# Patient Record
Sex: MALE | Race: WHITE
[De-identification: names, ages, dates, MRNs, and addresses within clinical notes are randomized per-mention and may not be internally consistent; named-entity substitution may affect disease eponyms.]

---

## 2017-01-10 PROBLEM — Z00.00 ENCOUNTER FOR PREVENTIVE HEALTH EXAMINATION: Status: ACTIVE | Noted: 2017-01-10

## 2017-01-25 ENCOUNTER — APPOINTMENT (OUTPATIENT)
Dept: HEART AND VASCULAR | Facility: CLINIC | Age: 62
End: 2017-01-25

## 2017-01-25 VITALS
DIASTOLIC BLOOD PRESSURE: 88 MMHG | HEIGHT: 69 IN | BODY MASS INDEX: 29.77 KG/M2 | WEIGHT: 201 LBS | HEART RATE: 67 BPM | SYSTOLIC BLOOD PRESSURE: 124 MMHG

## 2017-01-25 DIAGNOSIS — R06.02 SHORTNESS OF BREATH: ICD-10-CM

## 2017-01-25 DIAGNOSIS — R07.89 OTHER CHEST PAIN: ICD-10-CM

## 2017-01-25 DIAGNOSIS — Z87.891 PERSONAL HISTORY OF NICOTINE DEPENDENCE: ICD-10-CM

## 2017-01-25 DIAGNOSIS — Z83.3 FAMILY HISTORY OF DIABETES MELLITUS: ICD-10-CM

## 2017-01-25 DIAGNOSIS — Z82.3 FAMILY HISTORY OF STROKE: ICD-10-CM

## 2017-01-25 DIAGNOSIS — Z78.9 OTHER SPECIFIED HEALTH STATUS: ICD-10-CM

## 2017-01-25 DIAGNOSIS — Z82.49 FAMILY HISTORY OF ISCHEMIC HEART DISEASE AND OTHER DISEASES OF THE CIRCULATORY SYSTEM: ICD-10-CM

## 2017-01-25 DIAGNOSIS — Z86.39 PERSONAL HISTORY OF OTHER ENDOCRINE, NUTRITIONAL AND METABOLIC DISEASE: ICD-10-CM

## 2017-01-25 RX ORDER — ASPIRIN 81 MG/1
81 TABLET, CHEWABLE ORAL DAILY
Qty: 30 | Refills: 5 | Status: ACTIVE | COMMUNITY
Start: 2017-01-25

## 2017-01-26 ENCOUNTER — MEDICATION RENEWAL (OUTPATIENT)
Age: 62
End: 2017-01-26

## 2017-01-26 LAB
ALBUMIN SERPL ELPH-MCNC: 4.8 G/DL
ALP BLD-CCNC: 76 U/L
ALT SERPL-CCNC: 59 U/L
ANION GAP SERPL CALC-SCNC: 19 MMOL/L
AST SERPL-CCNC: 54 U/L
BASOPHILS # BLD AUTO: 0.03 K/UL
BASOPHILS NFR BLD AUTO: 0.4 %
BILIRUB SERPL-MCNC: 0.8 MG/DL
BUN SERPL-MCNC: 20 MG/DL
CALCIUM SERPL-MCNC: 10.8 MG/DL
CHLORIDE SERPL-SCNC: 100 MMOL/L
CHOLEST SERPL-MCNC: 286 MG/DL
CHOLEST/HDLC SERPL: 3.2 RATIO
CO2 SERPL-SCNC: 21 MMOL/L
CREAT SERPL-MCNC: 1.28 MG/DL
EOSINOPHIL # BLD AUTO: 0.1 K/UL
EOSINOPHIL NFR BLD AUTO: 1.2 %
GLUCOSE SERPL-MCNC: 195 MG/DL
HBA1C MFR BLD HPLC: 10.8 %
HCT VFR BLD CALC: 39.8 %
HDLC SERPL-MCNC: 89 MG/DL
HGB BLD-MCNC: 13.1 G/DL
IMM GRANULOCYTES NFR BLD AUTO: 0.4 %
LDLC SERPL CALC-MCNC: 168 MG/DL
LDLC SERPL DIRECT ASSAY-MCNC: 210 MG/DL
LYMPHOCYTES # BLD AUTO: 1.86 K/UL
LYMPHOCYTES NFR BLD AUTO: 22 %
MAN DIFF?: NORMAL
MCHC RBC-ENTMCNC: 32.9 GM/DL
MCHC RBC-ENTMCNC: 33.1 PG
MCV RBC AUTO: 100.5 FL
MONOCYTES # BLD AUTO: 0.63 K/UL
MONOCYTES NFR BLD AUTO: 7.5 %
NEUTROPHILS # BLD AUTO: 5.79 K/UL
NEUTROPHILS NFR BLD AUTO: 68.5 %
PLATELET # BLD AUTO: 187 K/UL
POTASSIUM SERPL-SCNC: 5.3 MMOL/L
PROT SERPL-MCNC: 8.3 G/DL
RBC # BLD: 3.96 M/UL
RBC # FLD: 12.7 %
SODIUM SERPL-SCNC: 140 MMOL/L
TRIGL SERPL-MCNC: 144 MG/DL
WBC # FLD AUTO: 8.44 K/UL

## 2017-02-09 VITALS
OXYGEN SATURATION: 96 % | HEIGHT: 69 IN | RESPIRATION RATE: 16 BRPM | SYSTOLIC BLOOD PRESSURE: 107 MMHG | WEIGHT: 201.06 LBS | TEMPERATURE: 99 F | DIASTOLIC BLOOD PRESSURE: 66 MMHG | HEART RATE: 75 BPM

## 2017-02-09 RX ORDER — CHLORHEXIDINE GLUCONATE 213 G/1000ML
1 SOLUTION TOPICAL ONCE
Qty: 0 | Refills: 0 | Status: DISCONTINUED | OUTPATIENT
Start: 2017-02-10 | End: 2017-02-11

## 2017-02-09 NOTE — H&P ADULT. - HISTORY OF PRESENT ILLNESS
**SKELETON**    62 y/o M current ETOH abuser (1 bottle of wine/night), former smoker, strong FHx of CAD PMHx of CRI (Cr 1.33 12/30/16, unknown baseline), DM2, HTN, hyperlipidemia, ?dilated cardiomyopathy who presented to Dr. Morales's office 01/25/17 endorsing dyspnea on exertion associated with gas-like chest discomfort, which are resolved with rest and have been worsening over the past 6 months. During the visit EKG showed evidence of possibly an old inferior infarct and Echo showed no valvular abnormalities, mild diastolic dysfxn, EF 60%.  Pt subsequently underwent Nuclear Stress Test 02/02/17, which revealed apical and periapical ischemia, global hypokinesis, LVEF 41%. 60 y/o M current ETOH abuser (1 bottle of wine/night), former smoker, strong FHx of CAD (Father CABG, Brother with CABG/ MI in 50's), PMHx of Depression, recently diagnosed NIDDM2, HTN, Hyperlipidemia, ?Ventricular Arrhythmia in the setting of ?Dilated Cardiomyopathy s/p endomyocardial [id number] @[facility] (results negative per pt) who presented to Dr. Morales's office 01/25/17 endorsing dyspnea on exertion with walking 2-3 "uphill" city blocks, associated with gas-like chest discomfort, which are resolved with rest and have been worsening over the past 6 months. During his office visit EKG showed evidence of possibly an old inferior infarct and Echo showed no valvular abnormalities, mild diastolic dysfxn, EF 60%.  Pt subsequently underwent Nuclear Stress Test 02/02/17, which revealed apical and periapical ischemia, global hypokinesis, LVEF 41%.    Of note, pt had recent slightly elevated Cr 1.33, however pt denies history of any renal insufficiency/ renal disease.    In light of pt's strong Fhx of CAD, risk factors as above, CCS class 2/3 anginal symptoms and recent abnormal Nuclear Stress Test, pt is now being recommended to [facility] for cardiac catherization with possible intervention if clinically warranted to asses underlying CAD etiology. 60 y/o M current ETOH abuser (1 bottle of wine/night), former smoker, strong FHx of CAD (Father CABG, Brother with CABG/ MI in 50's), PMHx of Depression, recently diagnosed NIDDM2, HTN, Hyperlipidemia, ?Ventricular Tachycardia in the setting of ?Dilated Cardiomyopathy s/p endomyocardial [id number] @[facility] (results negative per pt) who presented to Dr. Morales's office 01/25/17 endorsing dyspnea on exertion with walking 2-3 "uphill" city blocks, associated with gas-like chest discomfort, which are resolved with rest and have been worsening over the past 6 months. During his office visit EKG showed evidence of possibly an old inferior infarct and Echo showed no valvular abnormalities, mild diastolic dysfxn, EF 60%.  On 02/02/17 pt subsequently underwent a Exercise Stress Test, which was normal, as well as a Nuclear Stress Test which revealed apical and periapical ischemia, global hypokinesis, LVEF 41%.    Of note, pt had recent slightly elevated Cr 1.33, however pt denies history of any renal insufficiency/ renal disease.    In light of pt's strong Fhx of CAD, risk factors as above, CCS class 2/3 anginal symptoms and recent abnormal Nuclear Stress Test, pt is now being recommended to [facility] for cardiac catherization with possible intervention if clinically warranted to asses underlying CAD etiology. 60 y/o M current ETOH abuser (1 bottle of wine/night), former smoker, strong FHx of CAD (Father CABG, Brother with CABG/ MI in 50's), PMHx of Depression, recently diagnosed NIDDM2, HTN, Hyperlipidemia, ?Ventricular Tachycardia in the setting of ?Dilated Cardiomyopathy s/p endomyocardial [id number] @[facility] (results negative per pt) who presented to Dr. Morales's office 01/25/17 endorsing dyspnea on exertion with walking 2-3 "uphill" city blocks, associated with gas-like chest discomfort, which are resolved with rest and have been worsening over the past 6 months. During his office visit EKG showed evidence of possibly an old inferior infarct and Echo showed no valvular abnormalities, mild diastolic dysfxn, EF 60%.  On 02/02/17 pt subsequently underwent a Exercise Stress Test, which was normal, as well as a Nuclear Stress Test which revealed apical and periapical ischemia, global hypokinesis, LVEF 41%.    Of note, outpt had recent slightly elevated Cr 1.33, and ALT 47 and AST 58, however pt denies history of any renal insufficiency/ renal disease or liver disease.    In light of pt's strong Fhx of CAD, risk factors as above, CCS class 2/3 anginal symptoms and recent abnormal Nuclear Stress Test, pt is now being recommended to [facility] for cardiac catherization with possible intervention if clinically warranted to asses underlying CAD etiology. 62 y/o M current ETOH abuser (1 bottle of wine/night), former smoker, strong FHx of CAD (Father CABG, Brother with CABG/ MI in 50's), PMHx of Depression, recently diagnosed NIDDM2, HTN, Hyperlipidemia, ?Ventricular Tachycardia in the setting of ?Dilated Cardiomyopathy s/p endomyocardial [id number] @[facility] (results negative per pt) who presented to Dr. Morales's office 01/25/17 endorsing dyspnea on exertion with walking 2-3 "uphill" city blocks, associated with gas-like chest discomfort, which are resolved with rest and have been worsening over the past 6 months. During his office visit EKG showed evidence of possibly an old inferior infarct and Echo showed no valvular abnormalities, mild diastolic dysfxn, EF 60%.  On 02/02/17 pt subsequently underwent a Exercise Stress Test, which was normal, as well as a Nuclear Stress Test which revealed apical and periapical ischemia, global hypokinesis, LVEF 41%.    Of note, per MD note, pt had recent slightly elevated Cr 1.33, and ALT 47 and AST 58, however pt denies history of any renal insufficiency/ renal disease or liver disease.    In light of pt's strong Fhx of CAD, risk factors as above, CCS class 2/3 anginal symptoms and recent abnormal Nuclear Stress Test, pt is now being recommended to [facility] for cardiac catherization with possible intervention if clinically warranted to asses underlying CAD etiology.

## 2017-02-09 NOTE — H&P ADULT. - ASSESSMENT
Pt is a 62 y/o M current ETOH abuser, former smoker, strong FHx of CAD, PMHx of Depression, recently diagnosed NIDDM2, HTN, Hyperlipidemia, ?Ventricular Tachycardia in the setting of ?Dilated Cardiomyopathy with CCS class 2/3 anginal symptoms and recent abnormal Nuclear Stress Test, who now presents for cardiac catheterization with possible intervention if clinically warranted to asses underlying CAD etiology.     Pt takes ASA 81mg PO at home. As discussed with Dr. Jones, pt given ASA 325mg and Plavix 600mg prior to cardiac catheterization.     Per MD note, pt had recent slightly elevated Cr 1.33, and ALT 47 and AST 58. Today 02/10/2017 Cr 1.27, AST 64, ALT 74.     ASA 2, Mallamati 2 Pt is a 60 y/o M current ETOH abuser, former smoker, strong FHx of CAD, PMHx of Depression, recently diagnosed NIDDM2, HTN, Hyperlipidemia, ?Ventricular Tachycardia in the setting of ?Dilated Cardiomyopathy with CCS class 2/3 anginal symptoms and recent abnormal Nuclear Stress Test, who now presents for cardiac catheterization with possible intervention if clinically warranted to asses underlying CAD etiology.     Pt takes ASA 81mg PO at home. As discussed with Dr. Jones, pt given ASA 325mg and Plavix 600mg prior to cardiac catheterization.     Per MD note, pt had recent slightly elevated Cr 1.33, and ALT 47 and AST 58. Today 02/10/2017 Cr 1.27, AST 64, ALT 74. As discussed with Dr. Morales, pt to get RUQ ultrasound post cardiac cath. Additionally, hepatitis serologies added to labwork.    ASA 2, Mallamati 2

## 2017-02-09 NOTE — H&P ADULT. - FAMILY HISTORY
Father  Still living? Unknown  Family history of CABG, Age at diagnosis: Age Unknown     Sibling  Still living? Unknown  Family history of CABG, Age at diagnosis: Age Unknown  Family history of heart attack, Age at diagnosis: Age Unknown  Family history of heart disease, Age at diagnosis: Age Unknown     Mother  Still living? Unknown  Family history of cerebrovascular accident (CVA), Age at diagnosis: Age Unknown

## 2017-02-10 ENCOUNTER — INPATIENT (INPATIENT)
Facility: HOSPITAL | Age: 62
LOS: 0 days | Discharge: HOME | DRG: 247 | End: 2017-02-11
Attending: INTERNAL MEDICINE | Admitting: INTERNAL MEDICINE
Payer: COMMERCIAL

## 2017-02-10 LAB
ALBUMIN SERPL ELPH-MCNC: 4.6 G/DL — SIGNIFICANT CHANGE UP (ref 3.4–5)
ALP SERPL-CCNC: 73 U/L — SIGNIFICANT CHANGE UP (ref 40–120)
ALT FLD-CCNC: 74 U/L — HIGH (ref 12–42)
ANION GAP SERPL CALC-SCNC: 11 MMOL/L — SIGNIFICANT CHANGE UP (ref 9–16)
ANION GAP SERPL CALC-SCNC: 9 MMOL/L — SIGNIFICANT CHANGE UP (ref 9–16)
APTT BLD: 34.6 SEC — SIGNIFICANT CHANGE UP (ref 27.5–37.4)
AST SERPL-CCNC: 64 U/L — HIGH (ref 15–37)
BASOPHILS NFR BLD AUTO: 0.4 % — SIGNIFICANT CHANGE UP (ref 0–2)
BILIRUB SERPL-MCNC: 0.8 MG/DL — SIGNIFICANT CHANGE UP (ref 0.2–1.2)
BUN SERPL-MCNC: 27 MG/DL — HIGH (ref 7–23)
BUN SERPL-MCNC: 27 MG/DL — HIGH (ref 7–23)
CALCIUM SERPL-MCNC: 8.4 MG/DL — LOW (ref 8.5–10.5)
CALCIUM SERPL-MCNC: 9.8 MG/DL — SIGNIFICANT CHANGE UP (ref 8.5–10.5)
CHLORIDE SERPL-SCNC: 105 MMOL/L — SIGNIFICANT CHANGE UP (ref 96–108)
CHLORIDE SERPL-SCNC: 107 MMOL/L — SIGNIFICANT CHANGE UP (ref 96–108)
CHOLEST SERPL-MCNC: 189 MG/DL — SIGNIFICANT CHANGE UP
CK MB CFR SERPL CALC: <1 NG/ML — SIGNIFICANT CHANGE UP (ref 0.5–3.6)
CO2 SERPL-SCNC: 23 MMOL/L — SIGNIFICANT CHANGE UP (ref 22–31)
CO2 SERPL-SCNC: 25 MMOL/L — SIGNIFICANT CHANGE UP (ref 22–31)
CREAT SERPL-MCNC: 1.27 MG/DL — SIGNIFICANT CHANGE UP (ref 0.5–1.3)
CREAT SERPL-MCNC: 1.28 MG/DL — SIGNIFICANT CHANGE UP (ref 0.5–1.3)
CRP SERPL-MCNC: 0.3 MG/DL — SIGNIFICANT CHANGE UP
EOSINOPHIL NFR BLD AUTO: 1.4 % — SIGNIFICANT CHANGE UP (ref 0–6)
GLUCOSE SERPL-MCNC: 148 MG/DL — HIGH (ref 70–99)
GLUCOSE SERPL-MCNC: 155 MG/DL — HIGH (ref 70–99)
HBA1C BLD-MCNC: 8.9 % — HIGH (ref 4.8–5.6)
HBV CORE IGM SER-ACNC: SIGNIFICANT CHANGE UP
HBV SURFACE AB SER-ACNC: SIGNIFICANT CHANGE UP
HBV SURFACE AG SER-ACNC: SIGNIFICANT CHANGE UP
HCT VFR BLD CALC: 39.1 % — SIGNIFICANT CHANGE UP (ref 39–50)
HCV AB S/CO SERPL IA: 0.14 S/CO — SIGNIFICANT CHANGE UP
HCV AB SERPL-IMP: SIGNIFICANT CHANGE UP
HDLC SERPL-MCNC: 83 MG/DL — SIGNIFICANT CHANGE UP
HGB BLD-MCNC: 13.6 G/DL — SIGNIFICANT CHANGE UP (ref 13–17)
INR BLD: 1.2 — HIGH (ref 0.88–1.16)
LIPID PNL WITH DIRECT LDL SERPL: 83 MG/DL — SIGNIFICANT CHANGE UP
LYMPHOCYTES # BLD AUTO: 25.3 % — SIGNIFICANT CHANGE UP (ref 13–44)
MAGNESIUM SERPL-MCNC: 1.8 MG/DL — SIGNIFICANT CHANGE UP (ref 1.6–2.4)
MCHC RBC-ENTMCNC: 33.1 PG — SIGNIFICANT CHANGE UP (ref 27–34)
MCHC RBC-ENTMCNC: 34.8 G/DL — SIGNIFICANT CHANGE UP (ref 32–36)
MCV RBC AUTO: 95.1 FL — SIGNIFICANT CHANGE UP (ref 80–100)
MONOCYTES NFR BLD AUTO: 10.4 % — SIGNIFICANT CHANGE UP (ref 2–14)
NEUTROPHILS NFR BLD AUTO: 62.5 % — SIGNIFICANT CHANGE UP (ref 43–77)
PLATELET # BLD AUTO: 198 K/UL — SIGNIFICANT CHANGE UP (ref 150–400)
POTASSIUM SERPL-MCNC: 4.1 MMOL/L — SIGNIFICANT CHANGE UP (ref 3.5–5.3)
POTASSIUM SERPL-MCNC: 5.3 MMOL/L — SIGNIFICANT CHANGE UP (ref 3.5–5.3)
POTASSIUM SERPL-SCNC: 4.1 MMOL/L — SIGNIFICANT CHANGE UP (ref 3.5–5.3)
POTASSIUM SERPL-SCNC: 5.3 MMOL/L — SIGNIFICANT CHANGE UP (ref 3.5–5.3)
PROT SERPL-MCNC: 9.1 G/DL — HIGH (ref 6.4–8.2)
PROTHROM AB SERPL-ACNC: 13.4 SEC — HIGH (ref 10–13.1)
RBC # BLD: 4.11 M/UL — LOW (ref 4.2–5.8)
RBC # FLD: 12.1 % — SIGNIFICANT CHANGE UP (ref 10.3–16.9)
SODIUM SERPL-SCNC: 139 MMOL/L — SIGNIFICANT CHANGE UP (ref 135–145)
SODIUM SERPL-SCNC: 141 MMOL/L — SIGNIFICANT CHANGE UP (ref 135–145)
TOTAL CHOLESTEROL/HDL RATIO MEASUREMENT: 2.3 RATIO — SIGNIFICANT CHANGE UP
TRIGL SERPL-MCNC: 116 MG/DL — SIGNIFICANT CHANGE UP
WBC # BLD: 9 K/UL — SIGNIFICANT CHANGE UP (ref 3.8–10.5)
WBC # FLD AUTO: 9 K/UL — SIGNIFICANT CHANGE UP (ref 3.8–10.5)

## 2017-02-10 PROCEDURE — 92928 PRQ TCAT PLMT NTRAC ST 1 LES: CPT | Mod: LC

## 2017-02-10 PROCEDURE — 92921: CPT | Mod: LD

## 2017-02-10 PROCEDURE — 93458 L HRT ARTERY/VENTRICLE ANGIO: CPT | Mod: 26,XU

## 2017-02-10 PROCEDURE — 92929: CPT | Mod: LD

## 2017-02-10 RX ORDER — ASPIRIN/CALCIUM CARB/MAGNESIUM 324 MG
81 TABLET ORAL DAILY
Qty: 0 | Refills: 0 | Status: DISCONTINUED | OUTPATIENT
Start: 2017-02-11 | End: 2017-02-11

## 2017-02-10 RX ORDER — DEXTROSE 50 % IN WATER 50 %
12.5 SYRINGE (ML) INTRAVENOUS ONCE
Qty: 0 | Refills: 0 | Status: DISCONTINUED | OUTPATIENT
Start: 2017-02-10 | End: 2017-02-11

## 2017-02-10 RX ORDER — LISINOPRIL 2.5 MG/1
20 TABLET ORAL DAILY
Qty: 0 | Refills: 0 | Status: DISCONTINUED | OUTPATIENT
Start: 2017-02-10 | End: 2017-02-11

## 2017-02-10 RX ORDER — MAGNESIUM OXIDE 400 MG ORAL TABLET 241.3 MG
800 TABLET ORAL ONCE
Qty: 0 | Refills: 0 | Status: COMPLETED | OUTPATIENT
Start: 2017-02-10 | End: 2017-02-10

## 2017-02-10 RX ORDER — ASPIRIN/CALCIUM CARB/MAGNESIUM 324 MG
325 TABLET ORAL ONCE
Qty: 0 | Refills: 0 | Status: COMPLETED | OUTPATIENT
Start: 2017-02-10 | End: 2017-02-10

## 2017-02-10 RX ORDER — ATORVASTATIN CALCIUM 80 MG/1
20 TABLET, FILM COATED ORAL AT BEDTIME
Qty: 0 | Refills: 0 | Status: DISCONTINUED | OUTPATIENT
Start: 2017-02-10 | End: 2017-02-11

## 2017-02-10 RX ORDER — SODIUM CHLORIDE 9 MG/ML
1000 INJECTION, SOLUTION INTRAVENOUS
Qty: 0 | Refills: 0 | Status: DISCONTINUED | OUTPATIENT
Start: 2017-02-10 | End: 2017-02-11

## 2017-02-10 RX ORDER — CLOPIDOGREL BISULFATE 75 MG/1
600 TABLET, FILM COATED ORAL ONCE
Qty: 0 | Refills: 0 | Status: COMPLETED | OUTPATIENT
Start: 2017-02-10 | End: 2017-02-10

## 2017-02-10 RX ORDER — DEXTROSE 50 % IN WATER 50 %
25 SYRINGE (ML) INTRAVENOUS ONCE
Qty: 0 | Refills: 0 | Status: DISCONTINUED | OUTPATIENT
Start: 2017-02-10 | End: 2017-02-11

## 2017-02-10 RX ORDER — INSULIN LISPRO 100/ML
VIAL (ML) SUBCUTANEOUS
Qty: 0 | Refills: 0 | Status: DISCONTINUED | OUTPATIENT
Start: 2017-02-10 | End: 2017-02-11

## 2017-02-10 RX ORDER — DEXTROSE 50 % IN WATER 50 %
1 SYRINGE (ML) INTRAVENOUS ONCE
Qty: 0 | Refills: 0 | Status: DISCONTINUED | OUTPATIENT
Start: 2017-02-10 | End: 2017-02-11

## 2017-02-10 RX ORDER — GLUCAGON INJECTION, SOLUTION 0.5 MG/.1ML
1 INJECTION, SOLUTION SUBCUTANEOUS ONCE
Qty: 0 | Refills: 0 | Status: DISCONTINUED | OUTPATIENT
Start: 2017-02-10 | End: 2017-02-11

## 2017-02-10 RX ORDER — METOPROLOL TARTRATE 50 MG
100 TABLET ORAL DAILY
Qty: 0 | Refills: 0 | Status: DISCONTINUED | OUTPATIENT
Start: 2017-02-10 | End: 2017-02-11

## 2017-02-10 RX ORDER — INSULIN LISPRO 100/ML
VIAL (ML) SUBCUTANEOUS ONCE
Qty: 0 | Refills: 0 | Status: COMPLETED | OUTPATIENT
Start: 2017-02-10 | End: 2017-02-10

## 2017-02-10 RX ORDER — SERTRALINE 25 MG/1
100 TABLET, FILM COATED ORAL DAILY
Qty: 0 | Refills: 0 | Status: DISCONTINUED | OUTPATIENT
Start: 2017-02-10 | End: 2017-02-11

## 2017-02-10 RX ORDER — CLOPIDOGREL BISULFATE 75 MG/1
75 TABLET, FILM COATED ORAL DAILY
Qty: 0 | Refills: 0 | Status: DISCONTINUED | OUTPATIENT
Start: 2017-02-11 | End: 2017-02-11

## 2017-02-10 RX ORDER — SODIUM CHLORIDE 9 MG/ML
1000 INJECTION INTRAMUSCULAR; INTRAVENOUS; SUBCUTANEOUS
Qty: 0 | Refills: 0 | Status: DISCONTINUED | OUTPATIENT
Start: 2017-02-10 | End: 2017-02-11

## 2017-02-10 RX ORDER — SODIUM CHLORIDE 9 MG/ML
500 INJECTION INTRAMUSCULAR; INTRAVENOUS; SUBCUTANEOUS
Qty: 0 | Refills: 0 | Status: DISCONTINUED | OUTPATIENT
Start: 2017-02-10 | End: 2017-02-10

## 2017-02-10 RX ADMIN — SODIUM CHLORIDE 75 MILLILITER(S): 9 INJECTION INTRAMUSCULAR; INTRAVENOUS; SUBCUTANEOUS at 18:49

## 2017-02-10 RX ADMIN — CLOPIDOGREL BISULFATE 600 MILLIGRAM(S): 75 TABLET, FILM COATED ORAL at 12:17

## 2017-02-10 RX ADMIN — ATORVASTATIN CALCIUM 20 MILLIGRAM(S): 80 TABLET, FILM COATED ORAL at 21:20

## 2017-02-10 RX ADMIN — Medication 1: at 22:04

## 2017-02-10 RX ADMIN — SODIUM CHLORIDE 75 MILLILITER(S): 9 INJECTION INTRAMUSCULAR; INTRAVENOUS; SUBCUTANEOUS at 12:17

## 2017-02-10 RX ADMIN — Medication 325 MILLIGRAM(S): at 12:17

## 2017-02-10 RX ADMIN — MAGNESIUM OXIDE 400 MG ORAL TABLET 800 MILLIGRAM(S): 241.3 TABLET ORAL at 23:50

## 2017-02-10 NOTE — CHART NOTE - NSCHARTNOTEFT_GEN_A_CORE
Interventional Cardiology Radial band Removal Note    Pt without complaints.  VSS.    Right/Left Radial access site D-Stat/Radar Hemoband in place, no hematoma, no bleed  Radial pulse: 2+    Hemostasis achieved with manual release of hemoband and 5 minutes manual pressure.    no  Vasovagal reaction.    Meds given: None    Right/Left Radial access site  no hematoma, no bleed  Radial pulse:    A/P:  s/p Dx Coronary Angiogram/FFR/IVUS/PTCA/Stent ([place] one)  -	continue to monitor  -	-OOB as tolerated  -	Post Procedure Instructions given  -                   Call [phone] if any access site issues.

## 2017-02-10 NOTE — PROGRESS NOTE ADULT - SUBJECTIVE AND OBJECTIVE BOX
INTERVAL HISTORY: Pt is feeling well s/p cath with Dr. Jones today.   	  MEDICATIONS:    insulin lispro (HumaLOG) corrective regimen sliding scale  Subcutaneous Once    PHYSICAL EXAM:  T(C): 37.2, Max: 37.2 (02-09 @ 16:19)  HR: 75 (75 - 75)  BP: 107/66 (107/66 - 107/66)  RR: 16 (16 - 16)  SpO2: 96% (96% - 96%)    Height (cm): 175.3 (02-10 @ 12:13)  Weight (kg): 91.2 (02-10 @ 12:13)  BMI (kg/m2): 29.7 (02-10 @ 12:13)  BSA (m2): 2.07 (02-10 @ 12:13)    Appearance: Normal	  Cardiovascular: Normal S1 S2, No JVD, No murmurs, No edema  Respiratory: Lungs clear to auscultation	  Psychiatry: A & O x 3, Mood & affect appropriate  Gastrointestinal:  Soft, Non-tender, + BS	  Skin: No rashes, No ecchymoses, No cyanosis  Neurologic: Non-focal  Extremities: Normal range of motion, No clubbing, cyanosis or edema  	  LABS:	 	                        13.6   9.0   )-----------( 198      ( 10 Feb 2017 11:17 )             39.1     10 Feb 2017 11:17    139    |  105    |  27     ----------------------------<  155    5.3     |  23     |  1.27     Ca    9.8        10 Feb 2017 11:17    TPro  9.1    /  Alb  4.6    /  TBili  0.8    /  DBili  x      /  AST  64     /  ALT  74     /  AlkPhos  73     10 Feb 2017 11:17     Lipid Profile: , , LDL 83, HDL 83  HgA1c: Hemoglobin A1C, Whole Blood: 8.9 % (02-10 @ 11:17)      ASSESSMENT/PLAN: 	  62 y/o M current ETOH abuser (1 bottle of wine/night), former smoker, strong FHx of CAD (Father CABG, Brother with CABG/ MI in 50's), PMHx of Depression, recently diagnosed NIDDM2, HTN, Hyperlipidemia, ?Ventricular Tachycardia in the setting of ?Dilated Cardiomyopathy s/p endomyocardial [id number] @ (results negative per pt) who presented to my office with exertional symptoms x 6 months and was referred for stress test demonstrating ischemia.  - Pt is now s/p cath with Dr. Jones demonstrating 95% p/m LAD, 80% OM1, 80% D1 now s/p KERMIT to all vessels. In light of this, patient should be maintained on DAPT with ASA/Plavix.  - Cont all home medications including, BB, ACEi. Lipitor was just initiated by me ~2 weeks ago. Please continue on current dose at this time.  - Pt with e/o transaminitis on labs with h/o alcohol intake. Pt counseled on limiting alcohol intake and is motivated to do so. We will check hepatitis serologies as well as a RUQ sono to assess for possible fatty liver. This should be followed by his outpatient PCP.  - Given his recent diagnosis of Diabetes he is currently on oral-anti hyperglycemic meds. I have requested he make a follow-up appointment with Dr. Yuko Conn upon discharge to further optimize his diabetes management.  - Dr Funes will be covering me this weekend and if he remains stable, he will likely be discharged tomorrow to f/u with me on his scheduled appointment February 24th at 12:00 pm.

## 2017-02-11 VITALS
RESPIRATION RATE: 16 BRPM | SYSTOLIC BLOOD PRESSURE: 117 MMHG | OXYGEN SATURATION: 98 % | DIASTOLIC BLOOD PRESSURE: 73 MMHG | HEART RATE: 76 BPM

## 2017-02-11 LAB
ANION GAP SERPL CALC-SCNC: 7 MMOL/L — LOW (ref 9–16)
BUN SERPL-MCNC: 24 MG/DL — HIGH (ref 7–23)
CALCIUM SERPL-MCNC: 9.4 MG/DL — SIGNIFICANT CHANGE UP (ref 8.5–10.5)
CHLORIDE SERPL-SCNC: 107 MMOL/L — SIGNIFICANT CHANGE UP (ref 96–108)
CO2 SERPL-SCNC: 24 MMOL/L — SIGNIFICANT CHANGE UP (ref 22–31)
CREAT SERPL-MCNC: 1.01 MG/DL — SIGNIFICANT CHANGE UP (ref 0.5–1.3)
GLUCOSE SERPL-MCNC: 168 MG/DL — HIGH (ref 70–99)
HBV SURFACE AB SER-ACNC: <3 MIU/ML — LOW
HCT VFR BLD CALC: 34.2 % — LOW (ref 39–50)
HGB BLD-MCNC: 11.6 G/DL — LOW (ref 13–17)
MAGNESIUM SERPL-MCNC: 2 MG/DL — SIGNIFICANT CHANGE UP (ref 1.6–2.4)
MCHC RBC-ENTMCNC: 33 PG — SIGNIFICANT CHANGE UP (ref 27–34)
MCHC RBC-ENTMCNC: 33.9 G/DL — SIGNIFICANT CHANGE UP (ref 32–36)
MCV RBC AUTO: 97.2 FL — SIGNIFICANT CHANGE UP (ref 80–100)
PLATELET # BLD AUTO: 120 K/UL — LOW (ref 150–400)
POTASSIUM SERPL-MCNC: 4.4 MMOL/L — SIGNIFICANT CHANGE UP (ref 3.5–5.3)
POTASSIUM SERPL-SCNC: 4.4 MMOL/L — SIGNIFICANT CHANGE UP (ref 3.5–5.3)
RBC # BLD: 3.52 M/UL — LOW (ref 4.2–5.8)
RBC # FLD: 12.6 % — SIGNIFICANT CHANGE UP (ref 10.3–16.9)
SODIUM SERPL-SCNC: 138 MMOL/L — SIGNIFICANT CHANGE UP (ref 135–145)
WBC # BLD: 5 K/UL — SIGNIFICANT CHANGE UP (ref 3.8–10.5)
WBC # FLD AUTO: 5 K/UL — SIGNIFICANT CHANGE UP (ref 3.8–10.5)

## 2017-02-11 PROCEDURE — 76705 ECHO EXAM OF ABDOMEN: CPT | Mod: 26

## 2017-02-11 PROCEDURE — 99239 HOSP IP/OBS DSCHRG MGMT >30: CPT

## 2017-02-11 PROCEDURE — 93010 ELECTROCARDIOGRAM REPORT: CPT

## 2017-02-11 RX ORDER — ASPIRIN/CALCIUM CARB/MAGNESIUM 324 MG
1 TABLET ORAL
Qty: 30 | Refills: 11 | OUTPATIENT
Start: 2017-02-11 | End: 2018-02-05

## 2017-02-11 RX ORDER — CLOPIDOGREL BISULFATE 75 MG/1
1 TABLET, FILM COATED ORAL
Qty: 30 | Refills: 11 | OUTPATIENT
Start: 2017-02-11 | End: 2018-02-05

## 2017-02-11 RX ORDER — ATORVASTATIN CALCIUM 80 MG/1
1 TABLET, FILM COATED ORAL
Qty: 30 | Refills: 2 | OUTPATIENT
Start: 2017-02-11 | End: 2017-05-11

## 2017-02-11 RX ADMIN — Medication 100 MILLIGRAM(S): at 07:15

## 2017-02-11 RX ADMIN — Medication: at 13:39

## 2017-02-11 RX ADMIN — Medication 81 MILLIGRAM(S): at 12:35

## 2017-02-11 RX ADMIN — CLOPIDOGREL BISULFATE 75 MILLIGRAM(S): 75 TABLET, FILM COATED ORAL at 12:35

## 2017-02-11 RX ADMIN — LISINOPRIL 20 MILLIGRAM(S): 2.5 TABLET ORAL at 06:45

## 2017-02-11 RX ADMIN — Medication 1: at 07:46

## 2017-02-11 RX ADMIN — SERTRALINE 100 MILLIGRAM(S): 25 TABLET, FILM COATED ORAL at 12:35

## 2017-02-11 NOTE — DISCHARGE NOTE ADULT - CARE PROVIDERS DIRECT ADDRESSES
[email or web address]

## 2017-02-11 NOTE — DISCHARGE NOTE ADULT - HOSPITAL COURSE
60 y/o M current ETOH abuser (1 bottle of wine/night), former smoker, strong FHx of CAD (Father CABG, Brother with CABG/ MI in 50's), PMHx of Depression, recently diagnosed NIDDM2, HTN, Hyperlipidemia, ?Ventricular Tachycardia in the setting of ?Dilated Cardiomyopathy s/p endomyocardial [id number] @[facility] (results negative per pt) who presented to Dr. Morales's office 01/25/17 endorsing dyspnea on exertion with walking 2-3 "uphill" city blocks, associated with gas-like chest discomfort, which are resolved with rest and have been worsening over the past 6 months. During his office visit EKG showed evidence of possibly an old inferior infarct and Echo showed no valvular abnormalities, mild diastolic dysfxn, EF 60%.  On 02/02/17 pt subsequently underwent a Exercise Stress Test, which was normal, as well as a Nuclear Stress Test which revealed apical and periapical ischemia, global hypokinesis, LVEF 41%.Of note, per MD note, pt had recent slightly elevated Cr 1.33, and ALT 47 and AST 58, however pt denies history of any renal insufficiency/ renal disease or liver disease.In light of pt's strong Fhx of CAD, risk factors as above, CCS class 2/3 anginal symptoms and recent abnormal Nuclear Stress Test, pt is now being recommended to [facility] for cardiac catheterization with possible intervention if clinically warranted to asses underlying CAD etiology. Pt underwent Cardiac Cath 2/10/17 with KERMIT to 95% mLAD, KERMIT to 70-80% D1, KERMIT to 80% OM1, radial site stable this AM no hematoma or bleed.  Pt noted to have elevated LFTS on admission, RUQ performed revealed     Pt to see Dr. Morales and Dr. Yuko Conn on 2/24/17 in their offices. He reports having all meds except Lipitor and ASA/Plavix which were sent electronically to his preferred pharmacy in [place], [place].  Pt stable for discharge as d/w Dr. Funes covering this weekend.  All d/c ppw signed and in chart. 62 y/o M current ETOH abuser (1 bottle of wine/night), former smoker, strong FHx of CAD (Father CABG, Brother with CABG/ MI in 50's), PMHx of Depression, recently diagnosed NIDDM2, HTN, Hyperlipidemia, ?Ventricular Tachycardia in the setting of ?Dilated Cardiomyopathy s/p endomyocardial [id number] @[facility] (results negative per pt) who presented to Dr. Morales's office 01/25/17 endorsing dyspnea on exertion with walking 2-3 "uphill" city blocks, associated with gas-like chest discomfort, which are resolved with rest and have been worsening over the past 6 months. During his office visit EKG showed evidence of possibly an old inferior infarct and Echo showed no valvular abnormalities, mild diastolic dysfxn, EF 60%.  On 02/02/17 pt subsequently underwent a Exercise Stress Test, which was normal, as well as a Nuclear Stress Test which revealed apical and periapical ischemia, global hypokinesis, LVEF 41%.Of note, per MD note, pt had recent slightly elevated Cr 1.33, and ALT 47 and AST 58, however pt denies history of any renal insufficiency/ renal disease or liver disease.In light of pt's strong Fhx of CAD, risk factors as above, CCS class 2/3 anginal symptoms and recent abnormal Nuclear Stress Test, pt is now being recommended to [facility] for cardiac catheterization with possible intervention if clinically warranted to asses underlying CAD etiology. Pt underwent Cardiac Cath 2/10/17 with KERMIT to 95% mLAD, KERMIT to 70-80% D1, KERMIT to 80% OM1, radial site stable this AM no hematoma or bleed.  Pt noted to have elevated LFTS on admission, RUQ performed revealed a normal study. Pt to see Dr. Morales and Dr. Yuko Conn on 2/24/17 in their offices. He reports having all meds except Lipitor and ASA/Plavix which were sent electronically to his preferred pharmacy in [place], [place].  Pt stable for discharge as d/w Dr. Funes covering this weekend.  All d/c ppw signed and in chart.

## 2017-02-11 NOTE — DISCHARGE NOTE ADULT - MEDICATION SUMMARY - MEDICATIONS TO TAKE
I will START or STAY ON the medications listed below when I get home from the hospital:    aspirin 81 mg oral tablet  -- 1 tab(s) by mouth once a day  -- Indication: For Coronary Artery Disease    sertraline 100 mg oral tablet  -- 1 tab(s) by mouth once a day  -- Indication: For Anti-depressant    Januvia 100 mg oral tablet  -- 1 tab(s) by mouth once a day  -- Indication: For Diabetes    atorvastatin 20 mg oral tablet  -- 1 tab(s) by mouth once a day  -- Indication: For Hyperlipidemia    lisinopril-hydroCHLOROthiazide 20mg-25mg oral tablet  -- 1 tab(s) by mouth once a day  -- Indication: For Hypertension    clopidogrel 75 mg oral tablet  -- 1 tab(s) by mouth once a day  -- Indication: For Coronary Artery Disease    Toprol- mg oral tablet, extended release  -- 1 tab(s) by mouth once a day  -- Indication: For Hypertension

## 2017-02-11 NOTE — DISCHARGE NOTE ADULT - CARE PLAN
Principal Discharge DX:	CAD S/P percutaneous coronary angioplasty  Goal:	You had 3 stents placed to blocked arteries and need to take Aspirin 81mg and Plavix 75mg daily and DO NOT STOP THESE MEDICATIONS UNLESS INSTRUCTED BY YOUR CARDIOLOGIST ONLY.  Instructions for follow-up, activity and diet:	Follow up with Dr. Morales on 2/24/17 in her office. You underwent a coronary angiogram and should wait 3 days before returning to ordinary activities. Do not drive for 2 days. Consult your doctor before returning to vigorous activity. You may return to work in 3-5 days. The catheter from your wrist was removed and you should remove the dressing in 24 hours. You may shower once the dressing is removed, but avoid baths, hot tubs, or swimming for 5 days to prevent infection. If you notice bleeding from the site, hardening and pain at the site, drainage or redness from the site, coolness/paleness of the extremity, swelling, or fever, please call [phone]. Please continue your aspirin and plavix as prescribed unless otherwise indicated by your cardiologist. All of your prescriptions have been sent to the pharmacy. Please follow up with   2/24/17. All of your needed prescriptions have been sent electronically to your pharmacy.  Secondary Diagnosis:	HTN (hypertension)  Goal:	Continue taking Hydrochlorothiazide 25mg daily, Lisinopril 20mg daily, ToprolXL 100mg daily.  Instructions for follow-up, activity and diet:	Follow up in Dr. Morales's office on 2/24/17  Secondary Diagnosis:	HLD (hyperlipidemia)  Instructions for follow-up, activity and diet:	Please continue your daily Lipitor 20mg daily to ensure your cardiac stent remains open.  Secondary Diagnosis:	DM (diabetes mellitus)  Instructions for follow-up, activity and diet:	Please continue to take your Januvia recently prescribed and monitor your blood glucose and see Dr. Yuko Conn for follow up on 2/24/17 by calling her office to see on same day as Dr. Morales.  Secondary Diagnosis:	Elevated liver function tests  Instructions for follow-up, activity and diet:	You had some elevated liver enzymes on your labs and an ultrasound to further evaluate as you drink alcohol regularly. Principal Discharge DX:	CAD S/P percutaneous coronary angioplasty  Goal:	You had 3 stents placed to blocked arteries and need to take Aspirin 81mg and Plavix 75mg daily and DO NOT STOP THESE MEDICATIONS UNLESS INSTRUCTED BY YOUR CARDIOLOGIST ONLY.  Instructions for follow-up, activity and diet:	Follow up with Dr. Morales on 2/24/17 in her office. You underwent a coronary angiogram and should wait 3 days before returning to ordinary activities. Do not drive for 2 days. Consult your doctor before returning to vigorous activity. You may return to work in 3-5 days. The catheter from your wrist was removed and you should remove the dressing in 24 hours. You may shower once the dressing is removed, but avoid baths, hot tubs, or swimming for 5 days to prevent infection. If you notice bleeding from the site, hardening and pain at the site, drainage or redness from the site, coolness/paleness of the extremity, swelling, or fever, please call [phone]. Please continue your aspirin and plavix as prescribed unless otherwise indicated by your cardiologist. All of your prescriptions have been sent to the pharmacy. Please follow up with   2/24/17. All of your needed prescriptions have been sent electronically to your pharmacy.  Secondary Diagnosis:	HTN (hypertension)  Goal:	Continue taking Hydrochlorothiazide 25mg daily, Lisinopril 20mg daily, ToprolXL 100mg daily.  Instructions for follow-up, activity and diet:	Follow up in Dr. Morales's office on 2/24/17  Secondary Diagnosis:	HLD (hyperlipidemia)  Instructions for follow-up, activity and diet:	Please continue your daily Lipitor 20mg daily to ensure your cardiac stent remains open.  Secondary Diagnosis:	DM (diabetes mellitus)  Instructions for follow-up, activity and diet:	Please continue to take your Januvia recently prescribed and monitor your blood glucose and see Dr. Yuko Conn for follow up on 2/24/17 by calling her office to see on same day as Dr. Morales.  Secondary Diagnosis:	Elevated liver function tests  Instructions for follow-up, activity and diet:	You had some elevated liver enzymes on your labs and an ultrasound to further evaluate as you drink alcohol regularly. Principal Discharge DX:	CAD S/P percutaneous coronary angioplasty  Goal:	You had 3 stents placed to blocked arteries and need to take Aspirin 81mg and Plavix 75mg daily and DO NOT STOP THESE MEDICATIONS UNLESS INSTRUCTED BY YOUR CARDIOLOGIST ONLY.  Instructions for follow-up, activity and diet:	Follow up with Dr. Morales on 2/24/17 in her office. You underwent a coronary angiogram and should wait 3 days before returning to ordinary activities. Do not drive for 2 days. Consult your doctor before returning to vigorous activity. You may return to work in 3-5 days. The catheter from your wrist was removed and you should remove the dressing in 24 hours. You may shower once the dressing is removed, but avoid baths, hot tubs, or swimming for 5 days to prevent infection. If you notice bleeding from the site, hardening and pain at the site, drainage or redness from the site, coolness/paleness of the extremity, swelling, or fever, please call [phone]. Please continue your aspirin and plavix as prescribed unless otherwise indicated by your cardiologist. All of your prescriptions have been sent to the pharmacy. Please follow up with   2/24/17. All of your needed prescriptions have been sent electronically to your pharmacy.  Secondary Diagnosis:	HTN (hypertension)  Goal:	Continue taking Hydrochlorothiazide 25mg daily, Lisinopril 20mg daily, ToprolXL 100mg daily.  Instructions for follow-up, activity and diet:	Follow up in Dr. Morales's office on 2/24/17  Secondary Diagnosis:	HLD (hyperlipidemia)  Instructions for follow-up, activity and diet:	Please continue your daily Lipitor 20mg daily to ensure your cardiac stent remains open.  Secondary Diagnosis:	DM (diabetes mellitus)  Instructions for follow-up, activity and diet:	Please continue to take your Januvia recently prescribed and monitor your blood glucose and see Dr. Yuko Conn for follow up on 2/24/17 by calling her office to see on same day as Dr. Morales.  Secondary Diagnosis:	Elevated liver function tests  Instructions for follow-up, activity and diet:	You had some elevated liver enzymes on your labs and an ultrasound to further evaluate as you drink alcohol regularly.

## 2017-02-11 NOTE — DISCHARGE NOTE ADULT - PLAN OF CARE
You had some elevated liver enzymes on your labs and an ultrasound to further evaluate as you drink alcohol regularly. Follow up with Dr. Morales on 2/24/17 in her office. You underwent a coronary angiogram and should wait 3 days before returning to ordinary activities. Do not drive for 2 days. Consult your doctor before returning to vigorous activity. You may return to work in 3-5 days. The catheter from your wrist was removed and you should remove the dressing in 24 hours. You may shower once the dressing is removed, but avoid baths, hot tubs, or swimming for 5 days to prevent infection. If you notice bleeding from the site, hardening and pain at the site, drainage or redness from the site, coolness/paleness of the extremity, swelling, or fever, please call [phone]. Please continue your aspirin and plavix as prescribed unless otherwise indicated by your cardiologist. All of your prescriptions have been sent to the pharmacy. Please follow up with   2/24/17. All of your needed prescriptions have been sent electronically to your pharmacy. You had 3 stents placed to blocked arteries and need to take Aspirin 81mg and Plavix 75mg daily and DO NOT STOP THESE MEDICATIONS UNLESS INSTRUCTED BY YOUR CARDIOLOGIST ONLY. Continue taking Hydrochlorothiazide 25mg daily, Lisinopril 20mg daily, ToprolXL 100mg daily. Follow up in Dr. Morales's office on 2/24/17 Please continue your daily Lipitor 20mg daily to ensure your cardiac stent remains open. Please continue to take your Januvia recently prescribed and monitor your blood glucose and see Dr. Yuko Conn for follow up on 2/24/17 by calling her office to see on same day as Dr. Morales.

## 2017-02-11 NOTE — DISCHARGE NOTE ADULT - CARE PROVIDER_API CALL
Madalyn Morales), Internal Medicine  [address] [address]  Phone: [phone]  Fax: [phone]    Yuko Conn), Endocrinology[facility]abDiabetes; Internal Medicine  [address] [place] [address]  Phone: [phone]  Fax: [phone]

## 2017-02-11 NOTE — DISCHARGE NOTE ADULT - PATIENT PORTAL LINK FT
“You can access the [facility] Patient Portal, offered by [facility], by registering with the following website: [email or web address]”

## 2017-02-13 PROCEDURE — 82553 CREATINE MB FRACTION: CPT

## 2017-02-13 PROCEDURE — 85025 COMPLETE CBC W/AUTO DIFF WBC: CPT

## 2017-02-13 PROCEDURE — 83036 HEMOGLOBIN GLYCOSYLATED A1C: CPT

## 2017-02-13 PROCEDURE — 86705 HEP B CORE ANTIBODY IGM: CPT

## 2017-02-13 PROCEDURE — 80048 BASIC METABOLIC PNL TOTAL CA: CPT

## 2017-02-13 PROCEDURE — C1894: CPT

## 2017-02-13 PROCEDURE — 86706 HEP B SURFACE ANTIBODY: CPT

## 2017-02-13 PROCEDURE — 76705 ECHO EXAM OF ABDOMEN: CPT

## 2017-02-13 PROCEDURE — 86140 C-REACTIVE PROTEIN: CPT

## 2017-02-13 PROCEDURE — 85610 PROTHROMBIN TIME: CPT

## 2017-02-13 PROCEDURE — 86803 HEPATITIS C AB TEST: CPT

## 2017-02-13 PROCEDURE — 36415 COLL VENOUS BLD VENIPUNCTURE: CPT

## 2017-02-13 PROCEDURE — 85730 THROMBOPLASTIN TIME PARTIAL: CPT

## 2017-02-13 PROCEDURE — 87340 HEPATITIS B SURFACE AG IA: CPT

## 2017-02-13 PROCEDURE — C1874: CPT

## 2017-02-13 PROCEDURE — 80061 LIPID PANEL: CPT

## 2017-02-13 PROCEDURE — C1769: CPT

## 2017-02-13 PROCEDURE — 82550 ASSAY OF CK (CPK): CPT

## 2017-02-13 PROCEDURE — 80053 COMPREHEN METABOLIC PANEL: CPT

## 2017-02-13 PROCEDURE — C1725: CPT

## 2017-02-13 PROCEDURE — C1887: CPT

## 2017-02-13 PROCEDURE — 85027 COMPLETE CBC AUTOMATED: CPT

## 2017-02-13 PROCEDURE — 93005 ELECTROCARDIOGRAM TRACING: CPT

## 2017-02-13 PROCEDURE — 83735 ASSAY OF MAGNESIUM: CPT

## 2017-02-15 DIAGNOSIS — R94.39 ABNORMAL RESULT OF OTHER CARDIOVASCULAR FUNCTION STUDY: ICD-10-CM

## 2017-02-15 DIAGNOSIS — Z79.82 LONG TERM (CURRENT) USE OF ASPIRIN: ICD-10-CM
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
## 2017-02-15 DIAGNOSIS — R94.5 ABNORMAL RESULTS OF LIVER FUNCTION STUDIES: ICD-10-CM

## 2017-02-15 DIAGNOSIS — I25.10 ATHEROSCLEROTIC HEART DISEASE OF NATIVE CORONARY ARTERY WITHOUT ANGINA PECTORIS: ICD-10-CM

## 2017-02-15 DIAGNOSIS — E78.5 HYPERLIPIDEMIA, UNSPECIFIED: ICD-10-CM

## 2017-02-15 DIAGNOSIS — Z87.891 PERSONAL HISTORY OF NICOTINE DEPENDENCE: ICD-10-CM

## 2017-02-15 DIAGNOSIS — F32.9 MAJOR DEPRESSIVE DISORDER, SINGLE EPISODE, UNSPECIFIED: ICD-10-CM

## 2017-02-15 DIAGNOSIS — Z79.84 LONG TERM (CURRENT) USE OF ORAL HYPOGLYCEMIC DRUGS: ICD-10-CM

## 2017-02-15 DIAGNOSIS — E11.9 TYPE 2 DIABETES MELLITUS WITHOUT COMPLICATIONS: ICD-10-CM

## 2017-02-15 DIAGNOSIS — Z82.49 FAMILY HISTORY OF ISCHEMIC HEART DISEASE AND OTHER DISEASES OF THE CIRCULATORY SYSTEM: ICD-10-CM

## 2017-02-15 DIAGNOSIS — F10.10 ALCOHOL ABUSE, UNCOMPLICATED: ICD-10-CM

## 2017-02-15 DIAGNOSIS — I10 ESSENTIAL (PRIMARY) HYPERTENSION: ICD-10-CM

## 2017-02-16 DIAGNOSIS — I25.110 ATHEROSCLEROTIC HEART DISEASE OF NATIVE CORONARY ARTERY WITH UNSTABLE ANGINA PECTORIS: ICD-10-CM

## 2017-02-23 RX ORDER — LISINOPRIL 30 MG/1
TABLET ORAL
Refills: 0 | Status: DISCONTINUED | COMMUNITY
End: 2017-02-23

## 2017-02-24 ENCOUNTER — APPOINTMENT (OUTPATIENT)
Dept: HEART AND VASCULAR | Facility: CLINIC | Age: 62
End: 2017-02-24

## 2017-02-24 VITALS
WEIGHT: 205 LBS | SYSTOLIC BLOOD PRESSURE: 110 MMHG | HEIGHT: 69 IN | DIASTOLIC BLOOD PRESSURE: 80 MMHG | BODY MASS INDEX: 30.36 KG/M2 | HEART RATE: 77 BPM

## 2017-03-24 RX ORDER — SITAGLIPTIN 50 MG/1
1 TABLET, FILM COATED ORAL
Qty: 0 | Refills: 0 | COMMUNITY

## 2017-03-24 RX ORDER — SERTRALINE 25 MG/1
1 TABLET, FILM COATED ORAL
Qty: 0 | Refills: 0 | COMMUNITY

## 2017-03-24 RX ORDER — METOPROLOL TARTRATE 50 MG
1 TABLET ORAL
Qty: 0 | Refills: 0 | COMMUNITY

## 2017-03-24 RX ORDER — ATORVASTATIN CALCIUM 80 MG/1
1 TABLET, FILM COATED ORAL
Qty: 0 | Refills: 0 | COMMUNITY

## 2017-03-24 RX ORDER — ASPIRIN/CALCIUM CARB/MAGNESIUM 324 MG
1 TABLET ORAL
Qty: 0 | Refills: 0 | COMMUNITY

## 2017-06-16 ENCOUNTER — APPOINTMENT (OUTPATIENT)
Dept: HEART AND VASCULAR | Facility: CLINIC | Age: 62
End: 2017-06-16

## 2017-06-16 VITALS
SYSTOLIC BLOOD PRESSURE: 102 MMHG | HEIGHT: 69 IN | HEART RATE: 84 BPM | DIASTOLIC BLOOD PRESSURE: 50 MMHG | BODY MASS INDEX: 30.07 KG/M2 | WEIGHT: 203 LBS

## 2017-06-16 DIAGNOSIS — I25.10 ATHEROSCLEROTIC HEART DISEASE OF NATIVE CORONARY ARTERY W/OUT ANGINA PECTORIS: ICD-10-CM

## 2017-07-07 ENCOUNTER — APPOINTMENT (OUTPATIENT)
Dept: HEART AND VASCULAR | Facility: CLINIC | Age: 62
End: 2017-07-07

## 2017-07-07 ENCOUNTER — APPOINTMENT (OUTPATIENT)
Dept: ENDOCRINOLOGY | Facility: CLINIC | Age: 62
End: 2017-07-07

## 2017-07-07 VITALS
HEART RATE: 73 BPM | HEIGHT: 69 IN | WEIGHT: 203 LBS | BODY MASS INDEX: 30.07 KG/M2 | DIASTOLIC BLOOD PRESSURE: 91 MMHG | SYSTOLIC BLOOD PRESSURE: 149 MMHG

## 2017-07-07 VITALS
SYSTOLIC BLOOD PRESSURE: 110 MMHG | DIASTOLIC BLOOD PRESSURE: 78 MMHG | BODY MASS INDEX: 29.77 KG/M2 | HEIGHT: 69 IN | WEIGHT: 201 LBS | HEART RATE: 73 BPM

## 2017-07-07 RX ORDER — LANCETS
EACH MISCELLANEOUS
Qty: 160 | Refills: 6 | Status: ACTIVE | COMMUNITY
Start: 2017-07-07 | End: 1900-01-01

## 2017-07-07 RX ORDER — CLOPIDOGREL BISULFATE 75 MG/1
75 TABLET, FILM COATED ORAL
Qty: 90 | Refills: 3 | Status: ACTIVE | COMMUNITY
Start: 2017-02-11 | End: 1900-01-01

## 2017-07-07 RX ORDER — BLOOD-GLUCOSE METER
W/DEVICE KIT MISCELLANEOUS
Qty: 1 | Refills: 0 | Status: ACTIVE | COMMUNITY
Start: 2017-07-07 | End: 1900-01-01

## 2017-07-07 RX ORDER — GLIMEPIRIDE 2 MG/1
2 TABLET ORAL DAILY
Qty: 30 | Refills: 0 | Status: DISCONTINUED | COMMUNITY
Start: 2017-07-07 | End: 2017-07-07

## 2017-07-07 RX ORDER — SITAGLIPTIN 100 MG/1
100 TABLET, FILM COATED ORAL DAILY
Qty: 30 | Refills: 0 | Status: ACTIVE | COMMUNITY
Start: 2017-02-23 | End: 1900-01-01

## 2017-07-07 RX ORDER — LANCETS 28 GAUGE
EACH MISCELLANEOUS
Qty: 60 | Refills: 6 | Status: ACTIVE | COMMUNITY
Start: 2017-07-07 | End: 1900-01-01

## 2017-07-12 RX ORDER — ATORVASTATIN CALCIUM 40 MG/1
40 TABLET, FILM COATED ORAL DAILY
Qty: 90 | Refills: 3 | Status: ACTIVE | COMMUNITY
Start: 2017-01-26 | End: 1900-01-01

## 2017-07-13 LAB
ALBUMIN SERPL ELPH-MCNC: 5.4 G/DL
ALP BLD-CCNC: 72 U/L
ALT SERPL-CCNC: 61 U/L
ANION GAP SERPL CALC-SCNC: 21 MMOL/L
AST SERPL-CCNC: 73 U/L
BASOPHILS # BLD AUTO: 0.03 K/UL
BASOPHILS NFR BLD AUTO: 0.3 %
BILIRUB SERPL-MCNC: 0.8 MG/DL
BUN SERPL-MCNC: 17 MG/DL
CALCIUM SERPL-MCNC: 10.5 MG/DL
CHLORIDE SERPL-SCNC: 93 MMOL/L
CHOLEST SERPL-MCNC: 204 MG/DL
CHOLEST/HDLC SERPL: 2 RATIO
CO2 SERPL-SCNC: 17 MMOL/L
CREAT SERPL-MCNC: 1.51 MG/DL
CREAT SPEC-SCNC: 226 MG/DL
EOSINOPHIL # BLD AUTO: 0.09 K/UL
EOSINOPHIL NFR BLD AUTO: 0.9 %
GLUCOSE SERPL-MCNC: 135 MG/DL
HBA1C MFR BLD HPLC: 5.6 %
HCT VFR BLD CALC: 37.3 %
HDLC SERPL-MCNC: 102 MG/DL
HGB BLD-MCNC: 12.7 G/DL
IMM GRANULOCYTES NFR BLD AUTO: 0.3 %
LDLC SERPL CALC-MCNC: 85 MG/DL
LYMPHOCYTES # BLD AUTO: 2.17 K/UL
LYMPHOCYTES NFR BLD AUTO: 20.6 %
MAN DIFF?: NORMAL
MCHC RBC-ENTMCNC: 33.2 PG
MCHC RBC-ENTMCNC: 34 GM/DL
MCV RBC AUTO: 97.4 FL
MICROALBUMIN 24H UR DL<=1MG/L-MCNC: 8.6 MG/DL
MICROALBUMIN/CREAT 24H UR-RTO: 38 MG/G
MONOCYTES # BLD AUTO: 1.02 K/UL
MONOCYTES NFR BLD AUTO: 9.7 %
NEUTROPHILS # BLD AUTO: 7.17 K/UL
NEUTROPHILS NFR BLD AUTO: 68.2 %
PLATELET # BLD AUTO: 208 K/UL
POTASSIUM SERPL-SCNC: 4.9 MMOL/L
PROT SERPL-MCNC: 9 G/DL
RBC # BLD: 3.83 M/UL
RBC # FLD: 13.4 %
SODIUM SERPL-SCNC: 131 MMOL/L
T4 FREE SERPL-MCNC: 1.4 NG/DL
TRIGL SERPL-MCNC: 85 MG/DL
TSH SERPL-ACNC: 1.83 UIU/ML
WBC # FLD AUTO: 10.51 K/UL

## 2017-08-07 ENCOUNTER — APPOINTMENT (OUTPATIENT)
Dept: ENDOCRINOLOGY | Facility: CLINIC | Age: 62
End: 2017-08-07
Payer: COMMERCIAL

## 2017-08-07 VITALS
DIASTOLIC BLOOD PRESSURE: 71 MMHG | HEIGHT: 69 IN | HEART RATE: 79 BPM | SYSTOLIC BLOOD PRESSURE: 128 MMHG | BODY MASS INDEX: 30.7 KG/M2 | WEIGHT: 207.25 LBS

## 2017-08-07 PROCEDURE — 99204 OFFICE O/P NEW MOD 45 MIN: CPT

## 2017-08-07 RX ORDER — METOPROLOL SUCCINATE 100 MG/1
100 TABLET, EXTENDED RELEASE ORAL
Refills: 0 | Status: ACTIVE | COMMUNITY

## 2017-08-07 RX ORDER — LISINOPRIL AND HYDROCHLOROTHIAZIDE TABLETS 20; 25 MG/1; MG/1
20-25 TABLET ORAL DAILY
Refills: 0 | Status: ACTIVE | COMMUNITY
Start: 2017-02-23

## 2017-08-07 RX ORDER — SERTRALINE HYDROCHLORIDE 100 MG/1
100 TABLET, FILM COATED ORAL
Refills: 0 | Status: ACTIVE | COMMUNITY

## 2017-11-03 ENCOUNTER — APPOINTMENT (OUTPATIENT)
Dept: ENDOCRINOLOGY | Facility: CLINIC | Age: 62
End: 2017-11-03
Payer: COMMERCIAL

## 2017-11-03 ENCOUNTER — APPOINTMENT (OUTPATIENT)
Dept: HEART AND VASCULAR | Facility: CLINIC | Age: 62
End: 2017-11-03
Payer: COMMERCIAL

## 2017-11-03 VITALS
HEART RATE: 79 BPM | DIASTOLIC BLOOD PRESSURE: 80 MMHG | WEIGHT: 211 LBS | BODY MASS INDEX: 31.16 KG/M2 | SYSTOLIC BLOOD PRESSURE: 142 MMHG

## 2017-11-03 VITALS
SYSTOLIC BLOOD PRESSURE: 141 MMHG | WEIGHT: 210.13 LBS | HEART RATE: 88 BPM | BODY MASS INDEX: 31.48 KG/M2 | HEIGHT: 68.5 IN | DIASTOLIC BLOOD PRESSURE: 89 MMHG

## 2017-11-03 VITALS — DIASTOLIC BLOOD PRESSURE: 80 MMHG | SYSTOLIC BLOOD PRESSURE: 120 MMHG

## 2017-11-03 DIAGNOSIS — E78.5 HYPERLIPIDEMIA, UNSPECIFIED: ICD-10-CM

## 2017-11-03 DIAGNOSIS — I10 ESSENTIAL (PRIMARY) HYPERTENSION: ICD-10-CM

## 2017-11-03 DIAGNOSIS — E11.9 TYPE 2 DIABETES MELLITUS W/OUT COMPLICATIONS: ICD-10-CM

## 2017-11-03 PROCEDURE — 99213 OFFICE O/P EST LOW 20 MIN: CPT

## 2017-11-03 PROCEDURE — 99214 OFFICE O/P EST MOD 30 MIN: CPT | Mod: 25

## 2017-11-03 PROCEDURE — 93000 ELECTROCARDIOGRAM COMPLETE: CPT

## 2017-11-03 RX ORDER — MULTIVITAMIN
TABLET ORAL
Refills: 0 | Status: ACTIVE | COMMUNITY
Start: 2017-11-03

## 2017-11-03 RX ORDER — METFORMIN HYDROCHLORIDE 500 MG/1
500 TABLET, COATED ORAL
Qty: 120 | Refills: 11 | Status: ACTIVE | COMMUNITY
Start: 2017-11-03 | End: 1900-01-01

## 2017-11-03 RX ORDER — METFORMIN HYDROCHLORIDE 500 MG/1
500 TABLET, COATED ORAL
Qty: 120 | Refills: 3 | Status: DISCONTINUED | COMMUNITY
Start: 2017-08-07 | End: 2017-11-03

## 2018-03-23 ENCOUNTER — APPOINTMENT (OUTPATIENT)
Dept: HEART AND VASCULAR | Facility: CLINIC | Age: 63
End: 2018-03-23

## 2018-04-02 ENCOUNTER — APPOINTMENT (OUTPATIENT)
Dept: ENDOCRINOLOGY | Facility: CLINIC | Age: 63
End: 2018-04-02

## 2018-04-10 ENCOUNTER — APPOINTMENT (OUTPATIENT)
Dept: HEART AND VASCULAR | Facility: CLINIC | Age: 63
End: 2018-04-10

## 2018-05-04 ENCOUNTER — APPOINTMENT (OUTPATIENT)
Dept: HEART AND VASCULAR | Facility: CLINIC | Age: 63
End: 2018-05-04

## 2018-05-04 ENCOUNTER — APPOINTMENT (OUTPATIENT)
Dept: ENDOCRINOLOGY | Facility: CLINIC | Age: 63
End: 2018-05-04

## 2018-07-23 PROBLEM — Z78.9 ALCOHOL USE: Status: ACTIVE | Noted: 2017-01-25

## 2019-01-24 NOTE — PATIENT PROFILE ADULT. - TEACHING/LEARNING LEARNING PREFERENCES
[FreeTextEntry1] : Impression:\par 1. Colorectal cancer screening, average risk\par 2. Obesity\par 3. screening for hepatitis B since patient from a high risk area\par \par Plan:\par - Check CBC, CMP, HBcAb, HBsAb, HBsAg\par - Schedule colonoscopy; discussed risks (bleeding, infection, perforation, missed lesions), benefits, alternatives and patient is agreeable\par - Ordered Golytely and Dulcolax split prep\par - Discussed lifestyle modifications including diet, exercise, weight loss\par - Return to GI clinic after colonoscopy
written material/verbal instruction/skill demonstration